# Patient Record
Sex: FEMALE | Race: WHITE | NOT HISPANIC OR LATINO | Employment: UNEMPLOYED | URBAN - METROPOLITAN AREA
[De-identification: names, ages, dates, MRNs, and addresses within clinical notes are randomized per-mention and may not be internally consistent; named-entity substitution may affect disease eponyms.]

---

## 2023-04-24 ENCOUNTER — OFFICE VISIT (OUTPATIENT)
Dept: ENDOCRINOLOGY | Facility: CLINIC | Age: 18
End: 2023-04-24
Payer: MEDICAID

## 2023-04-24 VITALS
OXYGEN SATURATION: 98 % | DIASTOLIC BLOOD PRESSURE: 65 MMHG | HEART RATE: 71 BPM | BODY MASS INDEX: 21.79 KG/M2 | HEIGHT: 63 IN | WEIGHT: 123 LBS | SYSTOLIC BLOOD PRESSURE: 112 MMHG

## 2023-04-24 DIAGNOSIS — R68.89 ABNORMAL ENDOCRINE LABORATORY TEST FINDING: ICD-10-CM

## 2023-04-24 DIAGNOSIS — L50.9 HIVES: Primary | ICD-10-CM

## 2023-04-24 DIAGNOSIS — R22.1 NECK SWELLING: ICD-10-CM

## 2023-04-24 RX ORDER — LACOSAMIDE 100 MG/1
TABLET ORAL
COMMUNITY
Start: 2023-03-03

## 2023-04-24 NOTE — PROGRESS NOTES
Chief Complaint   Patient presents with   • Thyroid Problem        Referring Provider  Cheryl Sylvester A*     HPI   Crista Smith is a 17 y.o. female had concerns including Thyroid Problem.     Pt has a reported history of thyroid issues.  Rheumatology checked labs in the past and due to an elevated thyroglobulin level the patient was told that she had Hashimoto's.  Thyroglobulin antibody was not checked.  TPO antibody was normal.  TSH has been normal.  She has a family history of thyroid disease.  Grandmother had a goiter..    Hasn't had a normal cycle since delivering her baby. Has only light bleeding but is on OCP. Started these shortly after delivery. Isn't breastfeeding.   Has hormonal acne.   Has upper neck swelling (higher than her thyroid) sometimes upon awakening. Saw ENT who told her she didn't have Hashimoto's. Unsure about the neck swelling. Hives also happen but are worse when she stays on OCP.       Past Medical History:   Diagnosis Date   • Hives    • Seizure disorder    • Thyroid nodule      History reviewed. No pertinent surgical history.   Family History   Problem Relation Age of Onset   • Hypertension Mother    • Mental illness Mother    • Hypertension Father    • Diabetes Father    • Thyroid disease Maternal Grandmother    • COPD Maternal Grandmother       Social History     Socioeconomic History   • Marital status: Single   Tobacco Use   • Smoking status: Every Day     Types: Electronic Cigarette   Substance and Sexual Activity   • Alcohol use: Never   • Drug use: Never   • Sexual activity: Yes     Partners: Male     Birth control/protection: Pill      Allergies   Allergen Reactions   • Shellfish-Derived Products Hives      Current Outpatient Medications on File Prior to Visit   Medication Sig Dispense Refill   • lacosamide (VIMPAT) 100 MG tablet tablet      • Norethin Ace-Eth Estrad-FE (JUNEL FE 24 PO) Take  by mouth.       No current facility-administered medications on file prior to  "visit.        Review of Systems   Constitutional: Positive for appetite change, diaphoresis and fatigue.   HENT: Positive for congestion, swollen glands and trouble swallowing.    Eyes: Positive for photophobia.   Respiratory: Positive for cough, shortness of breath and wheezing.    Gastrointestinal: Positive for abdominal pain and nausea.   Endocrine: Negative.    Genitourinary: Negative.    Musculoskeletal: Positive for arthralgias and neck pain.   Skin: Negative.    Allergic/Immunologic: Negative.    Neurological: Positive for seizures and headache.   Hematological: Negative.    Psychiatric/Behavioral: Positive for sleep disturbance.        /65   Pulse 71   Ht 160 cm (63\")   Wt 55.8 kg (123 lb)   SpO2 98%   BMI 21.79 kg/m²      Physical Exam    Constitutional:  well developed; well nourished  no acute distress   ENT/Thyroid: no thyromegaly  no palpable nodules   Eyes: EOM intact  Conjunctiva: clear   Respiratory:  breathing is unlabored  clear to auscultation bilaterally   Cardiovascular:  regular rate and rhythm, S1, S2 normal, no murmur, click, rub or gallop   Chest:  Not performed.   Abdomen: Not performed.   : Not performed.   Musculoskeletal: negative findings:  ROM of all joints is normal, no deformities present   Skin: dry and warm   Neuro: normal without focal findings and mental status, speech normal, alert and oriented x3   Psych: oriented to time, place and person, mood and affect are within normal limits     Labs/Imaging    TPO  Lab Results   Component Value Date    THYROIDAB <5 08/24/2021 8/24/21 Thyroglobulin (not antibody) 74    9/23/2022 TSH 0.99    Thyroid Ultrasound 04/24/23    Indication: History of thyroid nodule  Comparison Imaging: None available  Clinical History:  History of thyroid nodule    Real time high resolution imaging of the thyroid gland was performed in transverse and longitudinal planes. Previous imaging reports were reviewed if available and compared to the " current to assess stability.     Lobes:  The right lobe measured 4.4 cm L x 1.2 cm AP x 2.0 cm in TV dimension.    The isthmus measured 0.3 cm in thickness.    The left thyroid lobe measured 4.6 cm L x 1.2 cm AP x 1.4 cm in TV dimension.    Thyroid gland is homogeneous and contains single nodule.    Nodule 1   located in the left lower lobe, suspected posterior to the thyroid and measures 0.6 x 0.3 x 0.4 cm (L X AP X TV).  This nodule is cystic, homogeneous, hypoechoic with well-defined margins, and Grade I vascularity on Color Flow Doppler.  It has no artifacts    No pathologic lymph nodes were seen.    Impression:  Normal size thyroid with no distinct nodules.  Just outside and posterior to the left lower lobe is a 0.6 cm cystic nodule.    Recommendation:  No routine ultrasound monitoring is necessary. Normal thyroid ultrasound with incidentally noted 6 mm cyst.      Assessment and Plan    Diagnoses and all orders for this visit:    1. Hives (Primary)  2. Neck swelling  -     US Thyroid  3. Abnormal endocrine laboratory test finding    Hives and neck swelling are unrelated to the thyroid. See detailed US report as above.  Thyroglobulin level was elevated last year during rheumatology evaluation.  Thyroglobulin should not be checked in patients with an intact thyroid. Thyroglobulin is produced by normal thyroid tissue and does not suggest Hashimoto's.  Hashimoto's can be diagnosed based on ultrasound, elevated TPO antibody, or thyroglobulin antibody.  Her thyroid function is normal.     No follow-up needed.     Return as needed. The patient was instructed to contact the clinic with any interval questions or concerns.    Evita Petit, DO   Endocrinologist    Please note that portions of this note were completed with a voice recognition program.

## 2024-04-29 ENCOUNTER — READMISSION MANAGEMENT (OUTPATIENT)
Dept: CALL CENTER | Facility: HOSPITAL | Age: 19
End: 2024-04-29
Payer: MEDICAID

## 2024-04-29 NOTE — OUTREACH NOTE
Prep Survey      Flowsheet Row Responses   [facility] facility patient discharged from? Non-[facility]   Is LACE score < 7 ? Non-BH Discharge   Eligibility Not Eligible   What are the reasons patient is not eligible? Other  [has not seen [facility] provider in 5 years]   Does the patient have one of the following disease processes/diagnoses(primary or secondary)? Other   Prep survey completed? Yes            Maritza BE - Registered Nurse